# Patient Record
Sex: FEMALE | Race: OTHER | HISPANIC OR LATINO
[De-identification: names, ages, dates, MRNs, and addresses within clinical notes are randomized per-mention and may not be internally consistent; named-entity substitution may affect disease eponyms.]

---

## 2023-02-14 ENCOUNTER — APPOINTMENT (OUTPATIENT)
Dept: OTOLARYNGOLOGY | Facility: CLINIC | Age: 83
End: 2023-02-14

## 2023-05-05 ENCOUNTER — EMERGENCY (EMERGENCY)
Facility: HOSPITAL | Age: 83
LOS: 1 days | Discharge: HOME | End: 2023-05-05
Attending: STUDENT IN AN ORGANIZED HEALTH CARE EDUCATION/TRAINING PROGRAM
Payer: MEDICARE

## 2023-05-05 VITALS
RESPIRATION RATE: 18 BRPM | DIASTOLIC BLOOD PRESSURE: 81 MMHG | SYSTOLIC BLOOD PRESSURE: 154 MMHG | OXYGEN SATURATION: 98 % | HEART RATE: 80 BPM | WEIGHT: 175.05 LBS | HEIGHT: 62 IN | TEMPERATURE: 97 F

## 2023-05-05 PROCEDURE — 99284 EMERGENCY DEPT VISIT MOD MDM: CPT

## 2023-05-05 RX ORDER — SODIUM CHLORIDE 9 MG/ML
3 INJECTION INTRAMUSCULAR; INTRAVENOUS; SUBCUTANEOUS EVERY 8 HOURS
Refills: 0 | Status: DISCONTINUED | OUTPATIENT
Start: 2023-05-05 | End: 2023-05-09

## 2023-05-05 RX ORDER — SODIUM CHLORIDE 9 MG/ML
1000 INJECTION INTRAMUSCULAR; INTRAVENOUS; SUBCUTANEOUS ONCE
Refills: 0 | Status: COMPLETED | OUTPATIENT
Start: 2023-05-05 | End: 2023-05-05

## 2023-05-05 RX ORDER — ACETAMINOPHEN 500 MG
1000 TABLET ORAL ONCE
Refills: 0 | Status: COMPLETED | OUTPATIENT
Start: 2023-05-05 | End: 2023-05-05

## 2023-05-06 VITALS
DIASTOLIC BLOOD PRESSURE: 72 MMHG | SYSTOLIC BLOOD PRESSURE: 148 MMHG | OXYGEN SATURATION: 98 % | HEART RATE: 65 BPM | RESPIRATION RATE: 18 BRPM | TEMPERATURE: 98 F

## 2023-05-06 LAB
ALBUMIN SERPL ELPH-MCNC: 3.5 G/DL — SIGNIFICANT CHANGE UP (ref 3.5–5)
ALP SERPL-CCNC: 62 U/L — SIGNIFICANT CHANGE UP (ref 40–120)
ALT FLD-CCNC: 22 U/L DA — SIGNIFICANT CHANGE UP (ref 10–60)
ANION GAP SERPL CALC-SCNC: 4 MMOL/L — LOW (ref 5–17)
APPEARANCE UR: CLEAR — SIGNIFICANT CHANGE UP
AST SERPL-CCNC: 17 U/L — SIGNIFICANT CHANGE UP (ref 10–40)
BACTERIA # UR AUTO: ABNORMAL /HPF
BASOPHILS # BLD AUTO: 0.06 K/UL — SIGNIFICANT CHANGE UP (ref 0–0.2)
BASOPHILS NFR BLD AUTO: 0.8 % — SIGNIFICANT CHANGE UP (ref 0–2)
BILIRUB SERPL-MCNC: 0.3 MG/DL — SIGNIFICANT CHANGE UP (ref 0.2–1.2)
BILIRUB UR-MCNC: NEGATIVE — SIGNIFICANT CHANGE UP
BUN SERPL-MCNC: 13 MG/DL — SIGNIFICANT CHANGE UP (ref 7–18)
CALCIUM SERPL-MCNC: 9.2 MG/DL — SIGNIFICANT CHANGE UP (ref 8.4–10.5)
CHLORIDE SERPL-SCNC: 109 MMOL/L — HIGH (ref 96–108)
CO2 SERPL-SCNC: 28 MMOL/L — SIGNIFICANT CHANGE UP (ref 22–31)
COLOR SPEC: YELLOW — SIGNIFICANT CHANGE UP
CREAT SERPL-MCNC: 0.76 MG/DL — SIGNIFICANT CHANGE UP (ref 0.5–1.3)
DIFF PNL FLD: ABNORMAL
EGFR: 78 ML/MIN/1.73M2 — SIGNIFICANT CHANGE UP
EOSINOPHIL # BLD AUTO: 0.16 K/UL — SIGNIFICANT CHANGE UP (ref 0–0.5)
EOSINOPHIL NFR BLD AUTO: 2 % — SIGNIFICANT CHANGE UP (ref 0–6)
EPI CELLS # UR: ABNORMAL /HPF
GLUCOSE SERPL-MCNC: 97 MG/DL — SIGNIFICANT CHANGE UP (ref 70–99)
GLUCOSE UR QL: NEGATIVE — SIGNIFICANT CHANGE UP
HCT VFR BLD CALC: 35.1 % — SIGNIFICANT CHANGE UP (ref 34.5–45)
HGB BLD-MCNC: 11.1 G/DL — LOW (ref 11.5–15.5)
IMM GRANULOCYTES NFR BLD AUTO: 0.3 % — SIGNIFICANT CHANGE UP (ref 0–0.9)
KETONES UR-MCNC: NEGATIVE — SIGNIFICANT CHANGE UP
LACTATE SERPL-SCNC: 0.8 MMOL/L — SIGNIFICANT CHANGE UP (ref 0.7–2)
LEUKOCYTE ESTERASE UR-ACNC: ABNORMAL
LIDOCAIN IGE QN: 111 U/L — SIGNIFICANT CHANGE UP (ref 73–393)
LYMPHOCYTES # BLD AUTO: 2.2 K/UL — SIGNIFICANT CHANGE UP (ref 1–3.3)
LYMPHOCYTES # BLD AUTO: 28 % — SIGNIFICANT CHANGE UP (ref 13–44)
MCHC RBC-ENTMCNC: 28.1 PG — SIGNIFICANT CHANGE UP (ref 27–34)
MCHC RBC-ENTMCNC: 31.6 GM/DL — LOW (ref 32–36)
MCV RBC AUTO: 88.9 FL — SIGNIFICANT CHANGE UP (ref 80–100)
MONOCYTES # BLD AUTO: 0.82 K/UL — SIGNIFICANT CHANGE UP (ref 0–0.9)
MONOCYTES NFR BLD AUTO: 10.4 % — SIGNIFICANT CHANGE UP (ref 2–14)
NEUTROPHILS # BLD AUTO: 4.6 K/UL — SIGNIFICANT CHANGE UP (ref 1.8–7.4)
NEUTROPHILS NFR BLD AUTO: 58.5 % — SIGNIFICANT CHANGE UP (ref 43–77)
NITRITE UR-MCNC: NEGATIVE — SIGNIFICANT CHANGE UP
NRBC # BLD: 0 /100 WBCS — SIGNIFICANT CHANGE UP (ref 0–0)
PH UR: 7 — SIGNIFICANT CHANGE UP (ref 5–8)
PLATELET # BLD AUTO: 275 K/UL — SIGNIFICANT CHANGE UP (ref 150–400)
POTASSIUM SERPL-MCNC: 3.7 MMOL/L — SIGNIFICANT CHANGE UP (ref 3.5–5.3)
POTASSIUM SERPL-SCNC: 3.7 MMOL/L — SIGNIFICANT CHANGE UP (ref 3.5–5.3)
PROT SERPL-MCNC: 7.2 G/DL — SIGNIFICANT CHANGE UP (ref 6–8.3)
PROT UR-MCNC: NEGATIVE — SIGNIFICANT CHANGE UP
RBC # BLD: 3.95 M/UL — SIGNIFICANT CHANGE UP (ref 3.8–5.2)
RBC # FLD: 13.7 % — SIGNIFICANT CHANGE UP (ref 10.3–14.5)
RBC CASTS # UR COMP ASSIST: ABNORMAL /HPF (ref 0–2)
SODIUM SERPL-SCNC: 141 MMOL/L — SIGNIFICANT CHANGE UP (ref 135–145)
SP GR SPEC: 1.01 — SIGNIFICANT CHANGE UP (ref 1.01–1.02)
UROBILINOGEN FLD QL: NEGATIVE — SIGNIFICANT CHANGE UP
WBC # BLD: 7.86 K/UL — SIGNIFICANT CHANGE UP (ref 3.8–10.5)
WBC # FLD AUTO: 7.86 K/UL — SIGNIFICANT CHANGE UP (ref 3.8–10.5)
WBC UR QL: ABNORMAL /HPF (ref 0–5)

## 2023-05-06 PROCEDURE — 83690 ASSAY OF LIPASE: CPT

## 2023-05-06 PROCEDURE — 99284 EMERGENCY DEPT VISIT MOD MDM: CPT | Mod: 25

## 2023-05-06 PROCEDURE — 36415 COLL VENOUS BLD VENIPUNCTURE: CPT

## 2023-05-06 PROCEDURE — 81001 URINALYSIS AUTO W/SCOPE: CPT

## 2023-05-06 PROCEDURE — 74177 CT ABD & PELVIS W/CONTRAST: CPT | Mod: MA

## 2023-05-06 PROCEDURE — 74177 CT ABD & PELVIS W/CONTRAST: CPT | Mod: 26,MA

## 2023-05-06 PROCEDURE — 85025 COMPLETE CBC W/AUTO DIFF WBC: CPT

## 2023-05-06 PROCEDURE — 96374 THER/PROPH/DIAG INJ IV PUSH: CPT

## 2023-05-06 PROCEDURE — 80053 COMPREHEN METABOLIC PANEL: CPT

## 2023-05-06 PROCEDURE — 83605 ASSAY OF LACTIC ACID: CPT

## 2023-05-06 RX ORDER — CEPHALEXIN 500 MG
1 CAPSULE ORAL
Qty: 28 | Refills: 0
Start: 2023-05-06 | End: 2023-05-12

## 2023-05-06 RX ORDER — CEPHALEXIN 500 MG
500 CAPSULE ORAL ONCE
Refills: 0 | Status: COMPLETED | OUTPATIENT
Start: 2023-05-06 | End: 2023-05-06

## 2023-05-06 RX ADMIN — SODIUM CHLORIDE 1000 MILLILITER(S): 9 INJECTION INTRAMUSCULAR; INTRAVENOUS; SUBCUTANEOUS at 01:18

## 2023-05-06 RX ADMIN — Medication 500 MILLIGRAM(S): at 04:28

## 2023-05-06 RX ADMIN — Medication 400 MILLIGRAM(S): at 01:18

## 2023-05-06 NOTE — ED PROVIDER NOTE - PATIENT PORTAL LINK FT
You can access the FollowMyHealth Patient Portal offered by [facility] by registering at the following website: [email or web address]. By joining [employer]’s FollowMyHealth portal, you will also be able to view your health information using other applications (apps) compatible with our system.

## 2023-05-06 NOTE — ED PROVIDER NOTE - CLINICAL SUMMARY MEDICAL DECISION MAKING FREE TEXT BOX
Pt p/w irritation of the umbilicus with mild urinary symptoms and lower ABD pain. Reassuring exam.     Labs unremarkable. UA contaminated and nondiagnostic as a result. CT concerning for a mild cellulitis involving the umbilicus. On reassessment pt is feeling well. Will rx keflex to treat for both UTI and cellulitis and rec PMD f/u within 3 days. Discussed with pt my clinical impression and results, patient given strict return precautions if persistent or worsening of symptoms occurs, and need for close follow up. Pt expressed understanding and agrees with plan. Pt is well appearing with a reassuring exam. Discharge home with PMD or Specialist f/u within 3 days.

## 2023-05-06 NOTE — ED PROVIDER NOTE - OBJECTIVE STATEMENT
82-year-old female with history of hypertension, denies any other past medical history or chronic medical problems, presenting with 1 day of irritation around the umbilicus with a small amount of bleeding that resolved prior to arrival and lower abdominal pain with urinary hesitancy and no other associated symptoms. Pt denies associated Vaginal Bleeding or Discharge, pus drainage, Bloody or Black Stools, N/V, Diarrhea/Constipation, Dysuria, Fever, Syncope, Chest Pain, SOB, Focal Numbness/Weakness. No h/o ABD Surgeries/ Hernias/Ulcers. No other recent illness/ hospitalizations.

## 2023-05-06 NOTE — ED PROVIDER NOTE - PHYSICAL EXAMINATION
Vital Signs Reviewed  GEN: Comfortable, NAD, AAOx3  HEENT: NCAT, MMM, Neck Supple  RESP: CTAB, No rales/rhonchi/wheezing  CV: RRR, S1S2, No murmurs  ABD: Mild crusting within umbillicus with no erythema/TTP/fluctuance, Lower ABD TTP w/o guarding, Soft, ND, No masses, No CVA Tenderness  Extrem/Skin: Equal pulses bilat, No cyanosis/edema/rashes  Neuro: No focal deficits

## 2023-05-24 ENCOUNTER — APPOINTMENT (OUTPATIENT)
Dept: GASTROENTEROLOGY | Facility: CLINIC | Age: 83
End: 2023-05-24
Payer: MEDICARE

## 2023-05-24 DIAGNOSIS — Z87.442 PERSONAL HISTORY OF URINARY CALCULI: ICD-10-CM

## 2023-05-24 DIAGNOSIS — J45.30 MILD PERSISTENT ASTHMA, UNCOMPLICATED: ICD-10-CM

## 2023-05-24 DIAGNOSIS — Z86.73 PERSONAL HISTORY OF TRANSIENT ISCHEMIC ATTACK (TIA), AND CEREBRAL INFARCTION W/OUT RESIDUAL DEFICITS: ICD-10-CM

## 2023-05-24 PROCEDURE — 99204 OFFICE O/P NEW MOD 45 MIN: CPT | Mod: 95

## 2023-05-24 NOTE — ASSESSMENT
[FreeTextEntry1] : Patient was treated for cellulitis in the umbilical area with antibiotics.  The symptoms resolved.  She has been complaining of dark stools which she describes as black since April.  She has formed stool.  She is not on iron supplements.  She has no orthostatic changes.  H/H on 5/6 was 11.1/35.1.\par FOBT will be sent to the lab.  CBC and iron studies will be ordered.  A follow-up KUB in 2 weeks will be done.\par \par Time spent in evaluation, counseling, and coordination of care 45 minutes.

## 2023-05-24 NOTE — HISTORY OF PRESENT ILLNESS
[Home] : at home, [unfilled] , at the time of the visit. [Medical Office: ([facility])___] : at the medical office located in  [Verbal consent obtained from patient] : the patient, [unfilled] [FreeTextEntry1] : Patient is an 83-year-old female who was seen at [facility] on 5/5 in the emergency room.  She had some bleeding from her navel and cellulitis and was started on antibiotics with resolution of her symptoms.  However, since April she has noted some black stool.  She describes the stool as felecia.  She has about 4 bowel movements per day.  She denies any dizziness or lightheadedness or orthostatic type of changes.  She started herself on omeprazole 40 mg daily.  Patient had a colonoscopy and upper endoscopy in 2017 which she claims she was told were normal.  Blood work from 5/6/2023 revealed H/H 11.1/35.1, MCV 88.9.\par She was taking aspirin which she stopped on 5/6.\par Patient has no upper gastrointestinal symptoms such as heartburn, regurgitation, dysphagia, or early satiety.\par \par CAT scan of the abdomen and pelvis was done on 5/6 at [facility].  This revealed evidence of the cellulitis in the umbilical area.  She did have kidney stones and renal cysts.  Otherwise, it was nonrevealing.

## 2023-06-05 ENCOUNTER — NON-APPOINTMENT (OUTPATIENT)
Age: 83
End: 2023-06-05

## 2023-06-07 LAB — HEMOCCULT STL QL IA: NEGATIVE

## 2023-06-21 ENCOUNTER — APPOINTMENT (OUTPATIENT)
Dept: GASTROENTEROLOGY | Facility: CLINIC | Age: 83
End: 2023-06-21
Payer: MEDICARE

## 2023-06-21 PROCEDURE — 99214 OFFICE O/P EST MOD 30 MIN: CPT | Mod: 95

## 2023-06-21 RX ORDER — IRON POLYSACCHARIDE COMPLEX 150 MG
150 CAPSULE ORAL
Qty: 90 | Refills: 3 | Status: ACTIVE | COMMUNITY
Start: 2023-06-21 | End: 1900-01-01

## 2023-06-21 NOTE — HISTORY OF PRESENT ILLNESS
[FreeTextEntry1] : Patient is an 83-year-old female who was seen at [facility] on 5/5 in the emergency room.  She had some bleeding from her navel and cellulitis and was started on antibiotics with resolution of her symptoms.  However, since April she has noted some black stool.  She describes the stool as felecia.  She has about 4 bowel movements per day.  She denies any dizziness or lightheadedness or orthostatic type of changes.  She started herself on omeprazole 40 mg daily.  Patient had a colonoscopy and upper endoscopy in 2017 which she claims she was told were normal.  Blood work from 5/6/2023 revealed H/H 11.1/35.1, MCV 88.9.\par She was taking aspirin which she stopped on 5/6.\par Patient has no upper gastrointestinal symptoms such as heartburn, regurgitation, dysphagia, or early satiety.\par \par CAT scan of the abdomen and pelvis was done on 5/6 at [facility].  This revealed evidence of the cellulitis in the umbilical area.  She did have kidney stones and renal cysts.  Otherwise, it was nonrevealing.\par \par 6/21/2023-patient is doing better.  She has no more bleeding from the umbilical area and no more redness.  CAT scan did reveal a fat-containing small umbilical hernia with some thickening of the skin in that area.\par H/H was 11/36.3, MCV 91.4.  This was from 6/8.\par Ferritin 14, iron/TIBC 44/374, iron saturation 12%.\par FOBT was negative.\par She is taking omeprazole 40 mg/day.

## 2023-06-21 NOTE — REASON FOR VISIT
[Home] : at home, [unfilled] , at the time of the visit. [Medical Office: ([facility])___] : at the medical office located in  [Patient] : the patient [Follow-up] : a follow-up of an existing diagnosis

## 2023-06-21 NOTE — ASSESSMENT
[FreeTextEntry1] : Patient cellulitis has resolved.  Her H/H is stable.  She does have evidence of iron deficiency anemia.  She will be started on Ferrex 150 mg/day.  She will continue omeprazole 40 mg every other day.  She had a colonoscopy about 5 years ago which she claims was normal.\par \par Time spent in counseling and coordination of care 25 minutes.

## 2023-06-30 ENCOUNTER — NON-APPOINTMENT (OUTPATIENT)
Age: 83
End: 2023-06-30

## 2023-09-20 ENCOUNTER — APPOINTMENT (OUTPATIENT)
Dept: GASTROENTEROLOGY | Facility: CLINIC | Age: 83
End: 2023-09-20
Payer: MEDICARE

## 2023-09-20 DIAGNOSIS — K42.9 UMBILICAL HERNIA W/OUT OBSTRUCTION OR GANGRENE: ICD-10-CM

## 2023-09-20 DIAGNOSIS — R19.5 OTHER FECAL ABNORMALITIES: ICD-10-CM

## 2023-09-20 PROCEDURE — 99213 OFFICE O/P EST LOW 20 MIN: CPT | Mod: 95

## 2023-09-27 ENCOUNTER — NON-APPOINTMENT (OUTPATIENT)
Age: 83
End: 2023-09-27

## 2023-12-26 ENCOUNTER — APPOINTMENT (OUTPATIENT)
Dept: GASTROENTEROLOGY | Facility: CLINIC | Age: 83
End: 2023-12-26

## 2023-12-26 ENCOUNTER — APPOINTMENT (OUTPATIENT)
Dept: GASTROENTEROLOGY | Facility: CLINIC | Age: 83
End: 2023-12-26
Payer: MEDICARE

## 2023-12-26 DIAGNOSIS — D50.9 IRON DEFICIENCY ANEMIA, UNSPECIFIED: ICD-10-CM

## 2023-12-26 DIAGNOSIS — U07.1 COVID-19: ICD-10-CM

## 2023-12-26 DIAGNOSIS — R53.83 OTHER FATIGUE: ICD-10-CM

## 2023-12-26 DIAGNOSIS — Z87.19 PERSONAL HISTORY OF OTHER DISEASES OF THE DIGESTIVE SYSTEM: ICD-10-CM

## 2023-12-26 PROCEDURE — 99443: CPT

## 2023-12-26 RX ORDER — BROMPHENIRAMINE MALEATE, PSEUDOEPHEDRINE HYDROCHLORIDE, 2; 30; 10 MG/5ML; MG/5ML; MG/5ML
30-2-10 SYRUP ORAL
Qty: 240 | Refills: 0 | Status: ACTIVE | COMMUNITY
Start: 2023-11-24

## 2023-12-26 RX ORDER — VERAPAMIL HYDROCHLORIDE 180 MG/1
180 TABLET ORAL
Qty: 180 | Refills: 0 | Status: ACTIVE | COMMUNITY
Start: 2023-12-11

## 2023-12-26 RX ORDER — CIPROFLOXACIN 3 MG/ML
0.3 SOLUTION OPHTHALMIC
Qty: 5 | Refills: 0 | Status: ACTIVE | COMMUNITY
Start: 2023-09-28

## 2023-12-26 NOTE — ASSESSMENT
[FreeTextEntry1] : Patient is status post COVID infection at the end of November.  This manifested itself with fatigue.  Blood work is not consistent with iron deficiency anemia.  FOBT was negative.  She will continue her current regimen.  Time spent in counseling 22 minutes.

## 2023-12-26 NOTE — HISTORY OF PRESENT ILLNESS
[Home] : at home, [unfilled] , at the time of the visit. [Medical Office: ([facility])___] : at the medical office located in  [Verbal consent obtained from patient] : the patient, [unfilled] [FreeTextEntry1] : Patient is an 83-year-old female who was seen at [facility] on 5/5 in the emergency room.  She had some bleeding from her navel and cellulitis and was started on antibiotics with resolution of her symptoms.  However, since April she has noted some black stool.  She describes the stool as felecia.  She has about 4 bowel movements per day.  She denies any dizziness or lightheadedness or orthostatic type of changes.  She started herself on omeprazole 40 mg daily.  Patient had a colonoscopy and upper endoscopy in 2017 which she claims she was told were normal.  Blood work from 5/6/2023 revealed H/H 11.1/35.1, MCV 88.9. She was taking aspirin which she stopped on 5/6. Patient has no upper gastrointestinal symptoms such as heartburn, regurgitation, dysphagia, or early satiety.  CAT scan of the abdomen and pelvis was done on 5/6 at [facility].  This revealed evidence of the cellulitis in the umbilical area.  She did have kidney stones and renal cysts.  Otherwise, it was nonrevealing.  6/21/2023-patient is doing better.  She has no more bleeding from the umbilical area and no more redness.  CAT scan did reveal a fat-containing small umbilical hernia with some thickening of the skin in that area. H/H was 11/36.3, MCV 91.4.  This was from 6/8. Ferritin 14, iron/TIBC 44/374, iron saturation 12%. FOBT was negative. She is taking omeprazole 40 mg/day.  12/26/2023-Patient had COVID at the end of November.  Her major manifestation was fatigue.  Blood work revealed ferritin of 15, H/H 54/347, 13% iron saturation, H/H 11/36.9, MCV 89.8. She is not having any GI symptoms at this time.  9/20/2023-Patient is still complaining of dark stool but this is likely due to to iron.  She feels well without any abdominal pain.  Her cellulitis of the umbilicus has resolved.

## 2024-04-12 ENCOUNTER — RX RENEWAL (OUTPATIENT)
Age: 84
End: 2024-04-12

## 2024-04-12 RX ORDER — OMEPRAZOLE 40 MG/1
40 CAPSULE, DELAYED RELEASE ORAL
Qty: 30 | Refills: 3 | Status: ACTIVE | COMMUNITY
Start: 2023-06-21 | End: 1900-01-01

## 2024-05-21 ENCOUNTER — APPOINTMENT (OUTPATIENT)
Dept: GASTROENTEROLOGY | Facility: CLINIC | Age: 84
End: 2024-05-21

## 2024-05-21 PROCEDURE — 99442: CPT

## 2024-07-17 NOTE — ED ADULT NURSE NOTE - HISTORY OF COVID-19 VACCINATION
Recommendations from today's disease management visit:                                                      Recommend rechecking labs within the next 2-3 weeks   Will discuss potentially starting terbinafine with Dr. Orourke    Follow-up: 3 months (early October)    To schedule another MTM appointment, please call the clinic directly or you may call the MTM scheduling line at [phone] or toll-free at [phone].     My Clinical Pharmacist's contact information:                                                      Please feel free to contact me with any questions or concerns you have.      Pavithra Alvarado, PharmD, AAHIVP  Medication Therapy Management Pharmacist     
Yes

## 2024-10-02 ENCOUNTER — APPOINTMENT (OUTPATIENT)
Dept: GASTROENTEROLOGY | Facility: CLINIC | Age: 84
End: 2024-10-02
Payer: MEDICARE

## 2024-10-02 DIAGNOSIS — R19.4 CHANGE IN BOWEL HABIT: ICD-10-CM

## 2024-10-02 PROCEDURE — 99443: CPT

## 2024-10-02 RX ORDER — PANCRELIPASE 36000; 180000; 114000 [USP'U]/1; [USP'U]/1; [USP'U]/1
36000-114000 CAPSULE, DELAYED RELEASE PELLETS ORAL
Qty: 90 | Refills: 3 | Status: ACTIVE | COMMUNITY
Start: 2024-10-02 | End: 1900-01-01

## 2024-10-02 NOTE — HISTORY OF PRESENT ILLNESS
[Home] : at home, [unfilled] , at the time of the visit. [Medical Office: ([facility])___] : at the medical office located in  [Verbal consent obtained from patient] : the patient, [unfilled] [FreeTextEntry1] : Patient is an 83-year-old female who was seen at [facility] on 5/5/2023 in the emergency room.  She had some bleeding from her navel and cellulitis and was started on antibiotics with resolution of her symptoms.  However, since April she has noted some black stool.  She describes the stool as felecia.  She has about 4 bowel movements per day.  She denies any dizziness or lightheadedness or orthostatic type of changes.  She started herself on omeprazole 40 mg daily.  Patient had a colonoscopy and upper endoscopy in 2017 which she claims she was told were normal.  Blood work from 5/6/2023 revealed H/H 11.1/35.1, MCV 88.9. She was taking aspirin which she stopped on 5/6. Patient has no upper gastrointestinal symptoms such as heartburn, regurgitation, dysphagia, or early satiety.  CAT scan of the abdomen and pelvis was done on 5/6/2023 at [facility].  This revealed evidence of the cellulitis in the umbilical area.  She did have kidney stones and renal cysts.  Otherwise, it was nonrevealing.  6/21/2023-patient is doing better.  She has no more bleeding from the umbilical area and no more redness.  CAT scan did reveal a fat-containing small umbilical hernia with some thickening of the skin in that area. H/H was 11/36.3, MCV 91.4.  This was from 6/8. Ferritin 14, iron/TIBC 44/374, iron saturation 12%. FOBT was negative. She is taking omeprazole 40 mg/day.  12/26/2023-Patient had COVID at the end of November.  Her major manifestation was fatigue.  Blood work revealed ferritin of 15, H/H 54/347, 13% iron saturation, H/H 11/36.9, MCV 89.8. She is not having any GI symptoms at this time.  9/20/2023-Patient is still complaining of dark stool but this is likely due to to iron.  She feels well without any abdominal pain.  Her cellulitis of the umbilicus has resolved.  5/21/2024-Patient is not having any GI complaints.  Her stool is no longer black.  She has no abdominal pain.  There is no evidence of bleeding and her blood counts have remained stable.  10/2/2024-patient claims that 2 hours after she eats the food goes through her.  She is having frequent bowel movements with flatulence.  She denies seeing any blood in the stool.  She has been moving her bowels up to 10 times per day. Blood work from September revealed normal electrolytes, total protein/albumin was 7.2/4.1, LFTs were normal, H/H 11.9/37.4, MCV 85.4.

## 2024-10-02 NOTE — ASSESSMENT
[FreeTextEntry1] : Patient with frequent bowel movements without any blood in the stool.  She does complain of flatulence.  She will be recommended to take a probiotic once a day.  Creon with each meal will be given.  If this does not help and the symptoms persist, a trial of Xifaxan will be given.  Time spent in counseling and coordination of care 25 minutes.

## 2025-03-17 ENCOUNTER — APPOINTMENT (OUTPATIENT)
Dept: GASTROENTEROLOGY | Facility: CLINIC | Age: 85
End: 2025-03-17
Payer: MEDICARE

## 2025-03-17 DIAGNOSIS — R19.5 OTHER FECAL ABNORMALITIES: ICD-10-CM

## 2025-03-17 DIAGNOSIS — D50.9 IRON DEFICIENCY ANEMIA, UNSPECIFIED: ICD-10-CM

## 2025-03-17 DIAGNOSIS — R19.4 CHANGE IN BOWEL HABIT: ICD-10-CM

## 2025-03-17 PROCEDURE — 99213 OFFICE O/P EST LOW 20 MIN: CPT | Mod: 93
